# Patient Record
Sex: FEMALE | Race: WHITE | NOT HISPANIC OR LATINO | URBAN - METROPOLITAN AREA
[De-identification: names, ages, dates, MRNs, and addresses within clinical notes are randomized per-mention and may not be internally consistent; named-entity substitution may affect disease eponyms.]

---

## 2024-04-30 ENCOUNTER — LAB (OUTPATIENT)
Dept: URBAN - METROPOLITAN AREA CLINIC 92 | Facility: CLINIC | Age: 59
End: 2024-04-30

## 2024-04-30 ENCOUNTER — OV NP (OUTPATIENT)
Dept: URBAN - METROPOLITAN AREA CLINIC 92 | Facility: CLINIC | Age: 59
End: 2024-04-30

## 2024-04-30 VITALS
HEIGHT: 64 IN | DIASTOLIC BLOOD PRESSURE: 79 MMHG | SYSTOLIC BLOOD PRESSURE: 124 MMHG | BODY MASS INDEX: 19.81 KG/M2 | HEART RATE: 64 BPM | TEMPERATURE: 97.3 F | WEIGHT: 116 LBS

## 2024-04-30 PROBLEM — 69896004: Status: ACTIVE | Noted: 2024-04-30

## 2024-04-30 PROBLEM — 14760008: Status: ACTIVE | Noted: 2024-04-30

## 2024-04-30 RX ORDER — FLUTICASONE PROPIONATE 50 UG/1
USE 2 SPRAYS IN EACH NOSTRIL DAILY SPRAY, METERED NASAL
Qty: 16 GRAM | Refills: 0 | Status: ON HOLD | COMMUNITY

## 2024-04-30 RX ORDER — LEVOTHYROXINE SODIUM 75 UG/1
TAKE 1 TABLET BY MOUTH EVERY DAY TABLET ORAL
Qty: 90 EACH | Refills: 0 | Status: ACTIVE | COMMUNITY

## 2024-04-30 RX ORDER — AZELASTINE HYDROCHLORIDE 137 UG/1
SPRAY 2 SPRAYS INTO EACH NOSTRIL EVERY DAY SPRAY, METERED NASAL
Qty: 30 MILLILITER | Refills: 0 | Status: ON HOLD | COMMUNITY

## 2024-04-30 RX ORDER — FOLIC ACID 1 MG/1
TAKE 1 TABLET BY MOUTH EVERY DAY TABLET ORAL
Qty: 90 EACH | Refills: 1 | Status: ACTIVE | COMMUNITY

## 2024-04-30 RX ORDER — AZITHROMYCIN DIHYDRATE 250 MG/1
TAKE TWO TABLETS ORALLY ON DAY 1, THEN ONE TABLET ORALLY DAILY X FOUR DAYS TABLET ORAL
Qty: 6 EACH | Refills: 0 | Status: ON HOLD | COMMUNITY

## 2024-04-30 RX ORDER — PREDNISONE 5 MG/1
TAKE 1 TABLET BY MOUTH EVERY DAY TABLET ORAL
Qty: 90 EACH | Refills: 0 | Status: ON HOLD | COMMUNITY

## 2024-04-30 RX ORDER — FAMOTIDINE 40 MG/1
TAKE 1 TABLET BY MOUTH AT BEDTIME TABLET, FILM COATED ORAL
Qty: 90 EACH | Refills: 1 | Status: ACTIVE | COMMUNITY

## 2024-04-30 RX ORDER — CEPHALEXIN 500 MG/1
TAKE 1 CAPSULE BY MOUTH TWICE A DAY CAPSULE ORAL
Qty: 14 EACH | Refills: 0 | Status: ON HOLD | COMMUNITY

## 2024-04-30 RX ORDER — PANTOPRAZOLE SODIUM 40 MG/1
TAKE 1 TABLET BY MOUTH EVERY DAY TABLET, DELAYED RELEASE ORAL
Qty: 30 EACH | Refills: 1 | Status: ACTIVE | COMMUNITY

## 2024-04-30 RX ORDER — METHOTREXATE 2.5 MG/1
TAKE 7 TABLET BY ORAL ROUTE EVERY WEEK EACH WEEK TABLET ORAL
Qty: 84 EACH | Refills: 1 | Status: ACTIVE | COMMUNITY

## 2024-04-30 RX ORDER — CELECOXIB 200 MG/1
TAKE 1 CAPSULE BY MOUTH EVERY DAY TO TWICE DAY AS NEEDED CAPSULE ORAL
Qty: 180 EACH | Refills: 0 | Status: ACTIVE | COMMUNITY

## 2024-04-30 NOTE — HPI-TODAY'S VISIT:
60 yo fem ref by Dr Meena Torres for a gi consultation and a copy of this note will be sent to the ref provider. Pt last had her colonoscopy in Jan 2022- normal  Pt had an EGD 20 yrs ago. Pt has RA- takes methotrexate weekly and takes celebrex for years- daily. In Feb- started to get funny taste in her mouth, numb tongue. Pt saw another Dr in office of her PMD, Pt was started pantoprazole once a day and famotidine at night.  Pt also has fullness in throat and feels food wont go down past tongue. Pt feels like food sits in throat area. Pt c/o nausea and 2 days ago had "violent vomiting" and had some dairrhea. Occurred once.  Pt has not had these upper gi issues before. Pt has nausea and loss of appetite. Pt also c/o recent constipation and gassiness.  Pt had a bad flare of her RA in Jan and was put on prednisone.  Pt works for [employer].  and has twins- age 26.

## 2024-04-30 NOTE — PHYSICAL EXAM MUSCULOSKELETAL:
normal gait and station , no tenderness or deformities present Mart-1 - Positive Histology Text: MART-1 staining demonstrates areas of higher density and clustering of melanocytes with Pagetoid spread upwards within the epidermis. The surgical margins are positive for tumor cells.

## 2024-05-07 ENCOUNTER — OFFICE VISIT (OUTPATIENT)
Dept: URBAN - METROPOLITAN AREA SURGERY CENTER 16 | Facility: SURGERY CENTER | Age: 59
End: 2024-05-07
Payer: COMMERCIAL

## 2024-05-07 ENCOUNTER — CLAIMS CREATED FROM THE CLAIM WINDOW (OUTPATIENT)
Dept: URBAN - METROPOLITAN AREA CLINIC 4 | Facility: CLINIC | Age: 59
End: 2024-05-07
Payer: COMMERCIAL

## 2024-05-07 DIAGNOSIS — K21.9 ACID REFLUX DISEASE: ICD-10-CM

## 2024-05-07 DIAGNOSIS — R10.13 ABDOMINAL DISCOMFORT, EPIGASTRIC: ICD-10-CM

## 2024-05-07 DIAGNOSIS — K31.89 REACTIVE GASTROPATHY: ICD-10-CM

## 2024-05-07 DIAGNOSIS — K29.70 CHRONIC GASTRITIS: ICD-10-CM

## 2024-05-07 DIAGNOSIS — R13.19 CERVICAL DYSPHAGIA: ICD-10-CM

## 2024-05-07 DIAGNOSIS — K21.9 ACID REFLUX: ICD-10-CM

## 2024-05-07 DIAGNOSIS — K29.70 GASTRITIS, UNSPECIFIED, WITHOUT BLEEDING: ICD-10-CM

## 2024-05-07 DIAGNOSIS — K31.89 OTHER DISEASES OF STOMACH AND DUODENUM: ICD-10-CM

## 2024-05-07 PROCEDURE — 43239 EGD BIOPSY SINGLE/MULTIPLE: CPT | Performed by: INTERNAL MEDICINE

## 2024-05-07 PROCEDURE — G8907 PT DOC NO EVENTS ON DISCHARG: HCPCS | Performed by: INTERNAL MEDICINE

## 2024-05-07 PROCEDURE — 88305 TISSUE EXAM BY PATHOLOGIST: CPT | Performed by: PATHOLOGY

## 2024-05-07 PROCEDURE — 00731 ANES UPR GI NDSC PX NOS: CPT | Performed by: ANESTHESIOLOGIST ASSISTANT

## 2024-05-07 PROCEDURE — 00731 ANES UPR GI NDSC PX NOS: CPT | Performed by: ANESTHESIOLOGY

## 2024-05-07 PROCEDURE — 88342 IMHCHEM/IMCYTCHM 1ST ANTB: CPT | Performed by: PATHOLOGY

## 2024-05-07 PROCEDURE — 88312 SPECIAL STAINS GROUP 1: CPT | Performed by: PATHOLOGY

## 2024-06-03 PROBLEM — 235595009: Status: ACTIVE | Noted: 2024-06-03

## 2024-06-04 ENCOUNTER — OFFICE VISIT (OUTPATIENT)
Dept: URBAN - METROPOLITAN AREA TELEHEALTH 2 | Facility: TELEHEALTH | Age: 59
End: 2024-06-04
Payer: COMMERCIAL

## 2024-06-04 ENCOUNTER — DASHBOARD ENCOUNTERS (OUTPATIENT)
Age: 59
End: 2024-06-04

## 2024-06-04 VITALS — WEIGHT: 116 LBS | BODY MASS INDEX: 19.81 KG/M2 | HEIGHT: 64 IN

## 2024-06-04 DIAGNOSIS — R09.A2 GLOBUS SENSATION: ICD-10-CM

## 2024-06-04 DIAGNOSIS — K21.9 GASTROESOPHAGEAL REFLUX DISEASE, UNSPECIFIED WHETHER ESOPHAGITIS PRESENT: ICD-10-CM

## 2024-06-04 DIAGNOSIS — R11.2 NAUSEA AND VOMITING, UNSPECIFIED VOMITING TYPE: ICD-10-CM

## 2024-06-04 PROBLEM — 40930008: Status: ACTIVE | Noted: 2024-06-04

## 2024-06-04 PROCEDURE — 99204 OFFICE O/P NEW MOD 45 MIN: CPT | Performed by: INTERNAL MEDICINE

## 2024-06-04 PROCEDURE — 99214 OFFICE O/P EST MOD 30 MIN: CPT | Performed by: INTERNAL MEDICINE

## 2024-06-04 RX ORDER — CELECOXIB 200 MG/1
TAKE 1 CAPSULE BY MOUTH EVERY DAY TO TWICE DAY AS NEEDED CAPSULE ORAL
Qty: 180 EACH | Refills: 0 | Status: ACTIVE | COMMUNITY

## 2024-06-04 RX ORDER — AZELASTINE HYDROCHLORIDE 137 UG/1
SPRAY 2 SPRAYS INTO EACH NOSTRIL EVERY DAY SPRAY, METERED NASAL
Qty: 30 MILLILITER | Refills: 0 | Status: ON HOLD | COMMUNITY

## 2024-06-04 RX ORDER — AZITHROMYCIN DIHYDRATE 250 MG/1
TAKE TWO TABLETS ORALLY ON DAY 1, THEN ONE TABLET ORALLY DAILY X FOUR DAYS TABLET ORAL
Qty: 6 EACH | Refills: 0 | Status: ON HOLD | COMMUNITY

## 2024-06-04 RX ORDER — CEPHALEXIN 500 MG/1
TAKE 1 CAPSULE BY MOUTH TWICE A DAY CAPSULE ORAL
Qty: 14 EACH | Refills: 0 | Status: ON HOLD | COMMUNITY

## 2024-06-04 RX ORDER — FOLIC ACID 1 MG/1
TAKE 1 TABLET BY MOUTH EVERY DAY TABLET ORAL
Qty: 90 EACH | Refills: 1 | Status: ACTIVE | COMMUNITY

## 2024-06-04 RX ORDER — PANTOPRAZOLE SODIUM 40 MG/1
TAKE 1 TABLET BY MOUTH EVERY DAY TABLET, DELAYED RELEASE ORAL
Qty: 30 EACH | Refills: 1 | Status: ACTIVE | COMMUNITY

## 2024-06-04 RX ORDER — METHOTREXATE 2.5 MG/1
TAKE 7 TABLET BY ORAL ROUTE EVERY WEEK EACH WEEK TABLET ORAL
Qty: 84 EACH | Refills: 1 | Status: ACTIVE | COMMUNITY

## 2024-06-04 RX ORDER — PANTOPRAZOLE SODIUM 40 MG/1
1 TABLET TABLET, DELAYED RELEASE ORAL ONCE A DAY
Qty: 90 TABLET | Refills: 3 | OUTPATIENT
Start: 2024-06-04

## 2024-06-04 RX ORDER — FAMOTIDINE 40 MG/1
TAKE 1 TABLET BY MOUTH AT BEDTIME TABLET, FILM COATED ORAL
Qty: 90 EACH | Refills: 1 | Status: ACTIVE | COMMUNITY

## 2024-06-04 RX ORDER — LEVOTHYROXINE SODIUM 75 UG/1
TAKE 1 TABLET BY MOUTH EVERY DAY TABLET ORAL
Qty: 90 EACH | Refills: 0 | Status: ACTIVE | COMMUNITY

## 2024-06-04 RX ORDER — FLUTICASONE PROPIONATE 50 UG/1
USE 2 SPRAYS IN EACH NOSTRIL DAILY SPRAY, METERED NASAL
Qty: 16 GRAM | Refills: 0 | Status: ON HOLD | COMMUNITY

## 2024-06-04 RX ORDER — PREDNISONE 5 MG/1
TAKE 1 TABLET BY MOUTH EVERY DAY TABLET ORAL
Qty: 90 EACH | Refills: 0 | Status: ON HOLD | COMMUNITY

## 2024-06-04 NOTE — HPI-TODAY'S VISIT:
58 yo fem ref by Dr Meena Torres for a gi fu and a copy of this note will be sent to the ref provider. Pt last had her colonoscopy in Jan 2022- normal  Pt had an EGD 20 yrs ago. Pt has RA- takes methotrexate weekly and takes celebrex for years- daily. In Feb- started to get funny taste in her mouth, numb tongue. Pt saw another Dr in office of her PMD, Pt was started pantoprazole once a day and famotidine at night.  Pt also has fullness in throat and feels food wont go down past tongue. Pt feels like food sits in throat area. Pt c/o nausea and 2 days ago had "violent vomiting" and had some dairrhea. Occurred once.  Pt has not had these upper gi issues before. Pt has nausea and loss of appetite. Pt also c/o recent constipation and gassiness.  Pt had a bad flare of her RA in Jan and was put on prednisone.  Pt works for [employer].  and has twins- age 26. Today's visit- Pt is here for fu. Pt had an EGD on 5/7/24- reactive gastropathy and some reflux on esopheageal biopsies. No gross abn. Pt is waiting to get on orencia infusion. Pt is on methotrexate. Pt stopped NSAIDs now. Pt feels like her RA is active. Pt had stopped celebrex. Pt is on panto and famotidine. Pt will have her bone density checked. Pt on anti-reflux diet now.